# Patient Record
(demographics unavailable — no encounter records)

---

## 2025-05-14 NOTE — PHYSICAL EXAM
[Normocephalic] : normocephalic [EOMI] : extra ocular movement intact [Supple] : supple [No Supraclavicular Adenopathy] : no supraclavicular adenopathy [No Cervical Adenopathy] : no cervical adenopathy [de-identified] : Bilateral breast/axilla/supraclavicular area: No masses, discharge, or adenopathy

## 2025-05-14 NOTE — PHYSICAL EXAM
[Normocephalic] : normocephalic [EOMI] : extra ocular movement intact [Supple] : supple [No Supraclavicular Adenopathy] : no supraclavicular adenopathy [No Cervical Adenopathy] : no cervical adenopathy [de-identified] : Bilateral breast/axilla/supraclavicular area: No masses, discharge, or adenopathy

## 2025-05-14 NOTE — HISTORY OF PRESENT ILLNESS
[FreeTextEntry1] : Patient is a 59yo F who presents today for breast cancer screening. 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 She was referred by Dr. Sinan Genao. She has hx of HTN. Denies family history of breast or ovarian cancer. Patient denies palpable masses, skin changes, or nipple discharge bilaterally.  SHAYY Lifetime Risk- 12.7% 2024 2/25/23: B/l MG- scattered fibroglandular. SHANICE. BI-RADS 2 3/2/24: B/l MG- scattered fibroglandular. L asymmetry RA (rec dxMG/US). BI-RADS 0 3/29/24: L MG/US- heterogeneously dense. L nodularity less apparent. US- L cysts. L 0.5cm intraductal lesion RA (rec US bx). BI-RADS 4 4/5/24: L US bx RA (heart clip)- portions of dilated ducts w/ prominent infolding and rare detached papillary fragment. The findings may represent portions of papilloma, features of which are not entirely present in this material. Benign and high risk- rec surgical consult 10/21/2024L US-L RA 0.6 cm intraductal mass with clip, stable.  BI-RADS 2 (rec surgical consultation, if not surgically excised rec b/l mg&us 3/25) 3/20/2025B/L MG and US-heterogeneously dense.  B/L cysts and cluster of cysts.  L RA 0.6 cm previously biopsied intraductal mass.  BI-RADS 2

## 2025-05-14 NOTE — PAST MEDICAL HISTORY
[Surgical Menopause] : The patient is in surgical menopause [Menarche Age ____] : age at menarche was [unfilled] [Menopause Age____] : age at menopause was [unfilled] [Total Preg ___] : G[unfilled] [Live Births ___] : P[unfilled]  [Age At Live Birth ___] : Age at live birth: [unfilled] [History of Hormone Replacement Treatment] : has no history of hormone replacement treatment [FreeTextEntry6] : NO [FreeTextEntry7] : NO [FreeTextEntry8] : YES

## 2025-05-14 NOTE — PHYSICAL EXAM
[Normocephalic] : normocephalic [EOMI] : extra ocular movement intact [Supple] : supple [No Supraclavicular Adenopathy] : no supraclavicular adenopathy [No Cervical Adenopathy] : no cervical adenopathy [de-identified] : Bilateral breast/axilla/supraclavicular area: No masses, discharge, or adenopathy

## 2025-05-14 NOTE — PHYSICAL EXAM
[Normocephalic] : normocephalic [EOMI] : extra ocular movement intact [Supple] : supple [No Supraclavicular Adenopathy] : no supraclavicular adenopathy [No Cervical Adenopathy] : no cervical adenopathy [de-identified] : Bilateral breast/axilla/supraclavicular area: No masses, discharge, or adenopathy

## 2025-05-14 NOTE — HISTORY OF PRESENT ILLNESS
[FreeTextEntry1] : Patient is a 57yo F who presents today for breast cancer screening. 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 She was referred by Dr. Sinan Genao. She has hx of HTN. Denies family history of breast or ovarian cancer. Patient denies palpable masses, skin changes, or nipple discharge bilaterally.  SHAYY Lifetime Risk- 12.7% 2024 2/25/23: B/l MG- scattered fibroglandular. SHANICE. BI-RADS 2 3/2/24: B/l MG- scattered fibroglandular. L asymmetry RA (rec dxMG/US). BI-RADS 0 3/29/24: L MG/US- heterogeneously dense. L nodularity less apparent. US- L cysts. L 0.5cm intraductal lesion RA (rec US bx). BI-RADS 4 4/5/24: L US bx RA (heart clip)- portions of dilated ducts w/ prominent infolding and rare detached papillary fragment. The findings may represent portions of papilloma, features of which are not entirely present in this material. Benign and high risk- rec surgical consult 10/21/2024L US-L RA 0.6 cm intraductal mass with clip, stable.  BI-RADS 2 (rec surgical consultation, if not surgically excised rec b/l mg&us 3/25) 3/20/2025B/L MG and US-heterogeneously dense.  B/L cysts and cluster of cysts.  L RA 0.6 cm previously biopsied intraductal mass.  BI-RADS 2

## 2025-05-14 NOTE — PLAN
[TextEntry] :  Reviewed imaging findings and pathology discussed results with patient. Discussed IDP as a high risk lesion with low breast cancer association risk. Discussed options of surgical excision to r/u underlying carcinoma vs observation for stability on imaging. She is to return in March for annual B/l MG/US and office visit.